# Patient Record
Sex: MALE | Race: WHITE | NOT HISPANIC OR LATINO | Employment: FULL TIME | URBAN - METROPOLITAN AREA
[De-identification: names, ages, dates, MRNs, and addresses within clinical notes are randomized per-mention and may not be internally consistent; named-entity substitution may affect disease eponyms.]

---
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
## 2025-06-10 ENCOUNTER — HOSPITAL ENCOUNTER (OUTPATIENT)
Facility: HOSPITAL | Age: 20
Discharge: HOME | End: 2025-06-12
Attending: STUDENT IN AN ORGANIZED HEALTH CARE EDUCATION/TRAINING PROGRAM | Admitting: INTERNAL MEDICINE
Payer: COMMERCIAL

## 2025-06-10 DIAGNOSIS — K35.200 ACUTE APPENDICITIS WITH GENERALIZED PERITONITIS WITHOUT GANGRENE, PERFORATION, OR ABSCESS: Primary | ICD-10-CM

## 2025-06-10 DIAGNOSIS — K37 APPENDICITIS, UNSPECIFIED APPENDICITIS TYPE: ICD-10-CM

## 2025-06-10 LAB
ALBUMIN SERPL-MCNC: 4.4 G/DL (ref 3.5–5)
ALBUMIN/GLOB SERPL: 1.1 RATIO (ref 1.1–2)
ALP SERPL-CCNC: 65 UNIT/L (ref 40–150)
ALT SERPL-CCNC: 36 UNIT/L (ref 0–55)
ANION GAP SERPL CALC-SCNC: 11 MEQ/L
AST SERPL-CCNC: 21 UNIT/L (ref 11–45)
BASOPHILS # BLD AUTO: 0.05 X10(3)/MCL
BASOPHILS NFR BLD AUTO: 0.5 %
BILIRUB SERPL-MCNC: 1.3 MG/DL
BILIRUB UR QL STRIP.AUTO: ABNORMAL
BUN SERPL-MCNC: 13 MG/DL (ref 8.9–20.6)
CALCIUM SERPL-MCNC: 9.5 MG/DL (ref 8.4–10.2)
CHLORIDE SERPL-SCNC: 101 MMOL/L (ref 98–107)
CLARITY UR: CLEAR
CO2 SERPL-SCNC: 27 MMOL/L (ref 22–29)
COLOR UR AUTO: YELLOW
CREAT SERPL-MCNC: 0.9 MG/DL (ref 0.72–1.25)
CREAT/UREA NIT SERPL: 14
EOSINOPHIL # BLD AUTO: 0.12 X10(3)/MCL (ref 0–0.9)
EOSINOPHIL NFR BLD AUTO: 1.2 %
ERYTHROCYTE [DISTWIDTH] IN BLOOD BY AUTOMATED COUNT: 12.3 % (ref 11.5–17)
GFR SERPLBLD CREATININE-BSD FMLA CKD-EPI: >60 ML/MIN/1.73/M2
GLOBULIN SER-MCNC: 4.1 GM/DL (ref 2.4–3.5)
GLUCOSE SERPL-MCNC: 86 MG/DL (ref 74–100)
GLUCOSE UR QL STRIP: NEGATIVE
HCT VFR BLD AUTO: 39.7 % (ref 42–52)
HGB BLD-MCNC: 13.4 G/DL (ref 14–18)
HGB UR QL STRIP: NEGATIVE
IMM GRANULOCYTES # BLD AUTO: 0.04 X10(3)/MCL (ref 0–0.04)
IMM GRANULOCYTES NFR BLD AUTO: 0.4 %
KETONES UR QL STRIP: ABNORMAL
LEUKOCYTE ESTERASE UR QL STRIP: NEGATIVE
LYMPHOCYTES # BLD AUTO: 2.19 X10(3)/MCL (ref 0.6–4.6)
LYMPHOCYTES NFR BLD AUTO: 22.7 %
MCH RBC QN AUTO: 28.8 PG (ref 27–31)
MCHC RBC AUTO-ENTMCNC: 33.8 G/DL (ref 33–36)
MCV RBC AUTO: 85.4 FL (ref 80–94)
MONOCYTES # BLD AUTO: 0.82 X10(3)/MCL (ref 0.1–1.3)
MONOCYTES NFR BLD AUTO: 8.5 %
NEUTROPHILS # BLD AUTO: 6.41 X10(3)/MCL (ref 2.1–9.2)
NEUTROPHILS NFR BLD AUTO: 66.7 %
NITRITE UR QL STRIP: NEGATIVE
NRBC BLD AUTO-RTO: 0 %
PH UR STRIP: 6 [PH]
PLATELET # BLD AUTO: 279 X10(3)/MCL (ref 130–400)
PMV BLD AUTO: 10.7 FL (ref 7.4–10.4)
POTASSIUM SERPL-SCNC: 3.6 MMOL/L (ref 3.5–5.1)
PROT SERPL-MCNC: 8.5 GM/DL (ref 6.4–8.3)
PROT UR QL STRIP: NEGATIVE
RBC # BLD AUTO: 4.65 X10(6)/MCL (ref 4.7–6.1)
SODIUM SERPL-SCNC: 139 MMOL/L (ref 136–145)
SP GR UR STRIP.AUTO: 1.01 (ref 1–1.03)
UROBILINOGEN UR STRIP-ACNC: 0.2
WBC # BLD AUTO: 9.63 X10(3)/MCL (ref 4.5–11.5)

## 2025-06-10 PROCEDURE — 25000003 PHARM REV CODE 250: Performed by: STUDENT IN AN ORGANIZED HEALTH CARE EDUCATION/TRAINING PROGRAM

## 2025-06-10 PROCEDURE — 81003 URINALYSIS AUTO W/O SCOPE: CPT | Performed by: STUDENT IN AN ORGANIZED HEALTH CARE EDUCATION/TRAINING PROGRAM

## 2025-06-10 PROCEDURE — 63600175 PHARM REV CODE 636 W HCPCS: Performed by: SURGERY

## 2025-06-10 PROCEDURE — 96365 THER/PROPH/DIAG IV INF INIT: CPT

## 2025-06-10 PROCEDURE — 85025 COMPLETE CBC W/AUTO DIFF WBC: CPT | Performed by: STUDENT IN AN ORGANIZED HEALTH CARE EDUCATION/TRAINING PROGRAM

## 2025-06-10 PROCEDURE — 80053 COMPREHEN METABOLIC PANEL: CPT | Performed by: STUDENT IN AN ORGANIZED HEALTH CARE EDUCATION/TRAINING PROGRAM

## 2025-06-10 PROCEDURE — 25500020 PHARM REV CODE 255: Performed by: STUDENT IN AN ORGANIZED HEALTH CARE EDUCATION/TRAINING PROGRAM

## 2025-06-10 PROCEDURE — G0378 HOSPITAL OBSERVATION PER HR: HCPCS

## 2025-06-10 PROCEDURE — 99285 EMERGENCY DEPT VISIT HI MDM: CPT | Mod: 25

## 2025-06-10 PROCEDURE — 63600175 PHARM REV CODE 636 W HCPCS: Performed by: STUDENT IN AN ORGANIZED HEALTH CARE EDUCATION/TRAINING PROGRAM

## 2025-06-10 RX ORDER — SODIUM CHLORIDE, SODIUM LACTATE, POTASSIUM CHLORIDE, CALCIUM CHLORIDE 600; 310; 30; 20 MG/100ML; MG/100ML; MG/100ML; MG/100ML
1000 INJECTION, SOLUTION INTRAVENOUS
Status: COMPLETED | OUTPATIENT
Start: 2025-06-10 | End: 2025-06-10

## 2025-06-10 RX ORDER — SODIUM CHLORIDE, SODIUM LACTATE, POTASSIUM CHLORIDE, CALCIUM CHLORIDE 600; 310; 30; 20 MG/100ML; MG/100ML; MG/100ML; MG/100ML
INJECTION, SOLUTION INTRAVENOUS CONTINUOUS
Status: DISCONTINUED | OUTPATIENT
Start: 2025-06-10 | End: 2025-06-12 | Stop reason: HOSPADM

## 2025-06-10 RX ORDER — SODIUM CHLORIDE 0.9 % (FLUSH) 0.9 %
10 SYRINGE (ML) INJECTION
Status: DISCONTINUED | OUTPATIENT
Start: 2025-06-10 | End: 2025-06-12 | Stop reason: HOSPADM

## 2025-06-10 RX ORDER — METRONIDAZOLE 500 MG/100ML
500 INJECTION, SOLUTION INTRAVENOUS
Status: DISCONTINUED | OUTPATIENT
Start: 2025-06-10 | End: 2025-06-12 | Stop reason: HOSPADM

## 2025-06-10 RX ORDER — ONDANSETRON HYDROCHLORIDE 2 MG/ML
4 INJECTION, SOLUTION INTRAVENOUS EVERY 6 HOURS PRN
Status: DISCONTINUED | OUTPATIENT
Start: 2025-06-10 | End: 2025-06-11

## 2025-06-10 RX ORDER — MORPHINE SULFATE 4 MG/ML
4 INJECTION, SOLUTION INTRAMUSCULAR; INTRAVENOUS EVERY 4 HOURS PRN
Status: DISCONTINUED | OUTPATIENT
Start: 2025-06-10 | End: 2025-06-12 | Stop reason: HOSPADM

## 2025-06-10 RX ORDER — TALC
6 POWDER (GRAM) TOPICAL NIGHTLY PRN
Status: DISCONTINUED | OUTPATIENT
Start: 2025-06-10 | End: 2025-06-12 | Stop reason: HOSPADM

## 2025-06-10 RX ORDER — IOPAMIDOL 755 MG/ML
100 INJECTION, SOLUTION INTRAVASCULAR
Status: COMPLETED | OUTPATIENT
Start: 2025-06-10 | End: 2025-06-10

## 2025-06-10 RX ADMIN — IOPAMIDOL 100 ML: 755 INJECTION, SOLUTION INTRAVENOUS at 07:06

## 2025-06-10 RX ADMIN — METRONIDAZOLE 500 MG: 5 INJECTION, SOLUTION INTRAVENOUS at 10:06

## 2025-06-10 RX ADMIN — SODIUM CHLORIDE, POTASSIUM CHLORIDE, SODIUM LACTATE AND CALCIUM CHLORIDE 1000 ML: 600; 310; 30; 20 INJECTION, SOLUTION INTRAVENOUS at 08:06

## 2025-06-10 RX ADMIN — PIPERACILLIN AND TAZOBACTAM 4.5 G: 4; .5 INJECTION, POWDER, LYOPHILIZED, FOR SOLUTION INTRAVENOUS; PARENTERAL at 08:06

## 2025-06-10 RX ADMIN — SODIUM CHLORIDE, POTASSIUM CHLORIDE, SODIUM LACTATE AND CALCIUM CHLORIDE: 600; 310; 30; 20 INJECTION, SOLUTION INTRAVENOUS at 09:06

## 2025-06-10 NOTE — ED PROVIDER NOTES
Encounter Date: 6/10/2025       History     Chief Complaint   Patient presents with    Abdominal Pain     Abd pain   started Fri   has moved to [facility]      HPI    20-year-old male with no stated past medical history presents emergency department for right lower quadrant abdominal pain that started Friday, 4 days ago.  Patient states the pain worsened and then started easing up a little bit but still having pain.  He is concerned he may have had appendicitis.  Denies any nausea vomiting constipation or diarrhea.  Took some ultrasound showed helps with the pain a few days ago.  No fever.    Review of patient's allergies indicates:  No Known Allergies  Past Medical History:   Diagnosis Date    Known health problems: none      Past Surgical History:   Procedure Laterality Date    none       No family history on file.  Social History[1]  Review of Systems   Constitutional:  Negative for fever.   Respiratory:  Negative for cough.    Cardiovascular:  Negative for chest pain.   Gastrointestinal:  Positive for abdominal pain. Negative for constipation, diarrhea, nausea and vomiting.   Neurological:  Negative for headaches.   All other systems reviewed and are negative.      Physical Exam     Initial Vitals [06/10/25 1833]   BP Pulse Resp Temp SpO2   125/80 76 16 98.2 °F (36.8 °C) 100 %      MAP       --         Physical Exam    Nursing note and vitals reviewed.  Constitutional: He appears well-developed and well-nourished. No distress.   Cardiovascular:  Normal rate and regular rhythm.           Pulmonary/Chest: Breath sounds normal. No respiratory distress.   Abdominal: Abdomen is soft. There is abdominal tenderness (Mild right lower quadrant abdominal pain.  No rebound or guarding).   Musculoskeletal:         General: No tenderness. Normal range of motion.     Neurological: He is alert and oriented to person, place, and time.   Skin: Skin is warm. Capillary refill takes less than 2 seconds.         ED Course    Procedures  Labs Reviewed   COMPREHENSIVE METABOLIC PANEL - Abnormal       Result Value    Sodium 139      Potassium 3.6      Chloride 101      CO2 27      Glucose 86      Blood Urea Nitrogen 13.0      Creatinine 0.90      Calcium 9.5      Protein Total 8.5 (*)     Albumin 4.4      Globulin 4.1 (*)     Albumin/Globulin Ratio 1.1      Bilirubin Total 1.3      ALP 65      ALT 36      AST 21      eGFR >60      Anion Gap 11.0      BUN/Creatinine Ratio 14     CBC WITH DIFFERENTIAL - Abnormal    WBC 9.63      RBC 4.65 (*)     Hgb 13.4 (*)     Hct 39.7 (*)     MCV 85.4      MCH 28.8      MCHC 33.8      RDW 12.3      Platelet 279      MPV 10.7 (*)     Neut % 66.7      Lymph % 22.7      Mono % 8.5      Eos % 1.2      Basophil % 0.5      Imm Grans % 0.4      Neut # 6.41      Lymph # 2.19      Mono # 0.82      Eos # 0.12      Baso # 0.05      Imm Gran # 0.04      NRBC% 0.0     CBC W/ AUTO DIFFERENTIAL    Narrative:     The following orders were created for panel order CBC auto differential.  Procedure                               Abnormality         Status                     ---------                               -----------         ------                     CBC with Differential[[id number]]        Abnormal            Final result                 Please view results for these tests on the individual orders.   URINALYSIS, REFLEX TO URINE CULTURE          Imaging Results              CT Abdomen Pelvis With IV Contrast NO Oral Contrast (Preliminary result)  Result time 06/10/25 20:05:03      Preliminary result by Yariel Hernandez Jr., MD (06/10/25 20:05:03)                   Narrative:    START OF REPORT:  Technique: CT of the abdomen and pelvis was performed with axial images as well as sagittal and coronal reconstruction images with intravenous contrast.    Comparison: None available.    Clinical History: Abdominal pain acute nonlocalized.    Dosage Information: Automated Exposure Control was  utilized.    Findings:  Lines and Tubes: None.  Thorax:  Lungs: No focal infiltrate or consolidation is seen.  Pleura: No effusions or thickening.  Heart: The heart size is within normal limits.  Abdomen:  Abdominal Wall: No abdominal wall pathology is seen.  Liver: The liver appears unremarkable.  Biliary System: No intrahepatic or extrahepatic biliary duct dilatation is seen.  Gallbladder: The gallbladder appears unremarkable.  Pancreas: The pancreas appears unremarkable.  Adrenals: The adrenal glands appear unremarkable.  Kidneys: The kidneys appear unremarkable with no stones cysts masses or hydronephrosis.  Aorta: The abdominal aorta appears unremarkable.  IVC: Unremarkable.  Bowel:  Esophagus: The visualized esophagus appears unremarkable.  Stomach: The stomach appears unremarkable.  Duodenum: Unremarkable appearing duodenum.  Small Bowel: The small bowel appears unremarkable.  Colon: Nondistended.  Appendix: The appendix is distended measuring 1.5 cm on Image 97, Series 2 and demonstrates wall thickening periappendiceal inflammatory changes and an appendicolith at the base. This is consistent with appendicitis.  Peritoneum: No free intraperitoneal air is seen. Trace free fluid is seen in the pelvis.    Pelvis:  Bladder: The bladder appears unremarkable.  Male:  Prostate gland: The prostate gland appears unremarkable.    Bony structures:  Dorsal Spine: The visualized dorsal spine appears unremarkable.  Bony Pelvis: The visualized bony structures of the pelvis appear unremarkable.    Notifications: The results were discussed with the emergency room physician Dr Huber prior to dictation at 2025-06-10 20:03:56 CDT.      Impression:  1. The appendix is distended measuring 1.5 cm on Image 97, Series 2 and demonstrates wall thickening periappendiceal inflammatory changes and an appendicolith at the base. This is consistent with appendicitis.  2. Details and findings as discussed.                                          Medications   piperacillin-tazobactam (ZOSYN) 4.5 g in D5W 100 mL IVPB (MB+) (has no administration in time range)   ondansetron injection 4 mg (has no administration in time range)   morphine injection 4 mg (has no administration in time range)   lactated ringers infusion (has no administration in time range)   sodium chloride 0.9% flush 10 mL (has no administration in time range)   melatonin tablet 6 mg (has no administration in time range)   iopamidoL (ISOVUE-370) injection 100 mL (100 mLs Intravenous Given 6/10/25 1934)     Medical Decision Making  Initial Assessment:       Abdominal pain      Differential Diagnosis:   Judging by the patient's chief complaint and pertinent history, the patient has the following possible differential diagnoses, including but not limited to the following.  Some of these are deemed to be lower likelihood and some more likely based on my physical exam and history combined with possible lab work and/or imaging studies.   Please see the pertinent studies, and refer to the HPI.  Some of these diagnoses will take further evaluation to fully rule out, perhaps as an outpatient and the patient was encouraged to follow up when discharged for more comprehensive evaluation.      Abdominal pain, constipation, appendicitis, UTI, urolithiasis, pyelonephritis, diverticulitis, musculoskeletal pain,  as well as multiple other possible etiologies      Problems Addressed:  Acute appendicitis with generalized peritonitis without gangrene, perforation, or abscess: acute illness or injury    Amount and/or Complexity of Data Reviewed  Labs: ordered. Decision-making details documented in ED Course.  Radiology: ordered.    Risk  OTC drugs.  Prescription drug management.  Decision regarding hospitalization.               ED Course as of 06/10/25 2019   Tue Juan Pablo 10, 2025   1908 WBC: 9.63 [BS]   1908 Hemoglobin(!): 13.4 [BS]   1908 Hematocrit(!): 39.7 [BS]   1908 Platelet Count: 279 [BS]   2014 Spoke  with general surgeon on-call, Dr. Slater, admit.  Zosyn.  Pain medicine p.r.n..  NPO.  Admit to medicine [BS]   2015 Spoke with Dr. Whyte, hospitalist, agrees with the admission [BS]      ED Course User Index  [BS] Andi Huber MD                           Clinical Impression:  Final diagnoses:  [K35.200] Acute appendicitis with generalized peritonitis without gangrene, perforation, or abscess (Primary)          ED Disposition Condition    Observation                       [1]   Social History  Tobacco Use    Smoking status: Never    Smokeless tobacco: Never   Substance Use Topics    Alcohol use: Not Currently    Drug use: Not Currently        Andi Huber MD  06/10/25 2019

## 2025-06-11 ENCOUNTER — ANESTHESIA (OUTPATIENT)
Dept: SURGERY | Facility: HOSPITAL | Age: 20
End: 2025-06-11
Payer: COMMERCIAL

## 2025-06-11 ENCOUNTER — ANESTHESIA EVENT (OUTPATIENT)
Dept: SURGERY | Facility: HOSPITAL | Age: 20
End: 2025-06-11
Payer: COMMERCIAL

## 2025-06-11 PROBLEM — K35.200 ACUTE APPENDICITIS WITH GENERALIZED PERITONITIS WITHOUT GANGRENE, PERFORATION, OR ABSCESS: Status: ACTIVE | Noted: 2025-06-11

## 2025-06-11 LAB
ALBUMIN SERPL-MCNC: 3.8 G/DL (ref 3.5–5)
ALBUMIN/GLOB SERPL: 1.1 RATIO (ref 1.1–2)
ALP SERPL-CCNC: 56 UNIT/L (ref 40–150)
ALT SERPL-CCNC: 31 UNIT/L (ref 0–55)
ANION GAP SERPL CALC-SCNC: 8 MEQ/L
AST SERPL-CCNC: 17 UNIT/L (ref 11–45)
BASOPHILS # BLD AUTO: 0.03 X10(3)/MCL
BASOPHILS NFR BLD AUTO: 0.4 %
BILIRUB SERPL-MCNC: 1.4 MG/DL
BUN SERPL-MCNC: 12 MG/DL (ref 8.9–20.6)
CALCIUM SERPL-MCNC: 9.2 MG/DL (ref 8.4–10.2)
CHLORIDE SERPL-SCNC: 104 MMOL/L (ref 98–107)
CO2 SERPL-SCNC: 28 MMOL/L (ref 22–29)
CREAT SERPL-MCNC: 0.95 MG/DL (ref 0.72–1.25)
CREAT/UREA NIT SERPL: 13
EOSINOPHIL # BLD AUTO: 0.14 X10(3)/MCL (ref 0–0.9)
EOSINOPHIL NFR BLD AUTO: 1.8 %
ERYTHROCYTE [DISTWIDTH] IN BLOOD BY AUTOMATED COUNT: 12.1 % (ref 11.5–17)
GFR SERPLBLD CREATININE-BSD FMLA CKD-EPI: >60 ML/MIN/1.73/M2
GLOBULIN SER-MCNC: 3.4 GM/DL (ref 2.4–3.5)
GLUCOSE SERPL-MCNC: 83 MG/DL (ref 74–100)
HCT VFR BLD AUTO: 34.7 % (ref 42–52)
HGB BLD-MCNC: 11.4 G/DL (ref 14–18)
IMM GRANULOCYTES # BLD AUTO: 0.03 X10(3)/MCL (ref 0–0.04)
IMM GRANULOCYTES NFR BLD AUTO: 0.4 %
LYMPHOCYTES # BLD AUTO: 1.93 X10(3)/MCL (ref 0.6–4.6)
LYMPHOCYTES NFR BLD AUTO: 25.3 %
MCH RBC QN AUTO: 28.4 PG (ref 27–31)
MCHC RBC AUTO-ENTMCNC: 32.9 G/DL (ref 33–36)
MCV RBC AUTO: 86.3 FL (ref 80–94)
MONOCYTES # BLD AUTO: 0.75 X10(3)/MCL (ref 0.1–1.3)
MONOCYTES NFR BLD AUTO: 9.8 %
NEUTROPHILS # BLD AUTO: 4.74 X10(3)/MCL (ref 2.1–9.2)
NEUTROPHILS NFR BLD AUTO: 62.3 %
PLATELET # BLD AUTO: 230 X10(3)/MCL (ref 130–400)
PMV BLD AUTO: 10.8 FL (ref 7.4–10.4)
POTASSIUM SERPL-SCNC: 3.8 MMOL/L (ref 3.5–5.1)
PROT SERPL-MCNC: 7.2 GM/DL (ref 6.4–8.3)
RBC # BLD AUTO: 4.02 X10(6)/MCL (ref 4.7–6.1)
SODIUM SERPL-SCNC: 140 MMOL/L (ref 136–145)
WBC # BLD AUTO: 7.62 X10(3)/MCL (ref 4.5–11.5)

## 2025-06-11 PROCEDURE — 27201423 OPTIME MED/SURG SUP & DEVICES STERILE SUPPLY: Performed by: SURGERY

## 2025-06-11 PROCEDURE — 63600175 PHARM REV CODE 636 W HCPCS: Performed by: ANESTHESIOLOGY

## 2025-06-11 PROCEDURE — 96376 TX/PRO/DX INJ SAME DRUG ADON: CPT

## 2025-06-11 PROCEDURE — 25000003 PHARM REV CODE 250: Performed by: SURGERY

## 2025-06-11 PROCEDURE — 63600175 PHARM REV CODE 636 W HCPCS: Performed by: SURGERY

## 2025-06-11 PROCEDURE — 63600175 PHARM REV CODE 636 W HCPCS: Performed by: NURSE ANESTHETIST, CERTIFIED REGISTERED

## 2025-06-11 PROCEDURE — 36415 COLL VENOUS BLD VENIPUNCTURE: CPT | Performed by: STUDENT IN AN ORGANIZED HEALTH CARE EDUCATION/TRAINING PROGRAM

## 2025-06-11 PROCEDURE — 63600175 PHARM REV CODE 636 W HCPCS: Performed by: INTERNAL MEDICINE

## 2025-06-11 PROCEDURE — 25000003 PHARM REV CODE 250: Performed by: NURSE ANESTHETIST, CERTIFIED REGISTERED

## 2025-06-11 PROCEDURE — 96375 TX/PRO/DX INJ NEW DRUG ADDON: CPT

## 2025-06-11 PROCEDURE — 25000003 PHARM REV CODE 250: Performed by: INTERNAL MEDICINE

## 2025-06-11 PROCEDURE — 80053 COMPREHEN METABOLIC PANEL: CPT | Performed by: STUDENT IN AN ORGANIZED HEALTH CARE EDUCATION/TRAINING PROGRAM

## 2025-06-11 PROCEDURE — 36000708 HC OR TIME LEV III 1ST 15 MIN: Performed by: SURGERY

## 2025-06-11 PROCEDURE — 85025 COMPLETE CBC W/AUTO DIFF WBC: CPT | Performed by: STUDENT IN AN ORGANIZED HEALTH CARE EDUCATION/TRAINING PROGRAM

## 2025-06-11 PROCEDURE — 36000709 HC OR TIME LEV III EA ADD 15 MIN: Performed by: SURGERY

## 2025-06-11 PROCEDURE — 71000033 HC RECOVERY, INTIAL HOUR: Performed by: SURGERY

## 2025-06-11 PROCEDURE — 37000009 HC ANESTHESIA EA ADD 15 MINS: Performed by: SURGERY

## 2025-06-11 PROCEDURE — 37000008 HC ANESTHESIA 1ST 15 MINUTES: Performed by: SURGERY

## 2025-06-11 PROCEDURE — G0378 HOSPITAL OBSERVATION PER HR: HCPCS

## 2025-06-11 PROCEDURE — 94761 N-INVAS EAR/PLS OXIMETRY MLT: CPT

## 2025-06-11 PROCEDURE — 99900035 HC TECH TIME PER 15 MIN (STAT)

## 2025-06-11 RX ORDER — CEFOXITIN 2 G/1
INJECTION, POWDER, FOR SOLUTION INTRAVENOUS
Status: DISCONTINUED | OUTPATIENT
Start: 2025-06-11 | End: 2025-06-11

## 2025-06-11 RX ORDER — ONDANSETRON HYDROCHLORIDE 2 MG/ML
4 INJECTION, SOLUTION INTRAVENOUS EVERY 4 HOURS PRN
Status: DISCONTINUED | OUTPATIENT
Start: 2025-06-11 | End: 2025-06-12 | Stop reason: HOSPADM

## 2025-06-11 RX ORDER — DEXAMETHASONE SODIUM PHOSPHATE 4 MG/ML
INJECTION, SOLUTION INTRA-ARTICULAR; INTRALESIONAL; INTRAMUSCULAR; INTRAVENOUS; SOFT TISSUE
Status: DISCONTINUED | OUTPATIENT
Start: 2025-06-11 | End: 2025-06-11

## 2025-06-11 RX ORDER — ONDANSETRON HYDROCHLORIDE 2 MG/ML
INJECTION, SOLUTION INTRAVENOUS
Status: DISCONTINUED | OUTPATIENT
Start: 2025-06-11 | End: 2025-06-11

## 2025-06-11 RX ORDER — HYDROMORPHONE HYDROCHLORIDE 2 MG/ML
0.2 INJECTION, SOLUTION INTRAMUSCULAR; INTRAVENOUS; SUBCUTANEOUS EVERY 5 MIN PRN
Status: DISCONTINUED | OUTPATIENT
Start: 2025-06-11 | End: 2025-06-11

## 2025-06-11 RX ORDER — FENTANYL CITRATE 50 UG/ML
25 INJECTION, SOLUTION INTRAMUSCULAR; INTRAVENOUS EVERY 5 MIN PRN
Status: DISCONTINUED | OUTPATIENT
Start: 2025-06-11 | End: 2025-06-11

## 2025-06-11 RX ORDER — MIDAZOLAM HYDROCHLORIDE 1 MG/ML
INJECTION INTRAMUSCULAR; INTRAVENOUS
Status: DISCONTINUED | OUTPATIENT
Start: 2025-06-11 | End: 2025-06-11

## 2025-06-11 RX ORDER — PROPOFOL 10 MG/ML
VIAL (ML) INTRAVENOUS
Status: DISCONTINUED | OUTPATIENT
Start: 2025-06-11 | End: 2025-06-11

## 2025-06-11 RX ORDER — FAMOTIDINE 10 MG/ML
INJECTION, SOLUTION INTRAVENOUS
Status: DISCONTINUED | OUTPATIENT
Start: 2025-06-11 | End: 2025-06-11

## 2025-06-11 RX ORDER — GLUCAGON 1 MG
1 KIT INJECTION
Status: DISCONTINUED | OUTPATIENT
Start: 2025-06-11 | End: 2025-06-11

## 2025-06-11 RX ORDER — ACETAMINOPHEN 10 MG/ML
INJECTION, SOLUTION INTRAVENOUS
Status: DISCONTINUED | OUTPATIENT
Start: 2025-06-11 | End: 2025-06-11

## 2025-06-11 RX ORDER — FENTANYL CITRATE 50 UG/ML
INJECTION, SOLUTION INTRAMUSCULAR; INTRAVENOUS
Status: DISCONTINUED | OUTPATIENT
Start: 2025-06-11 | End: 2025-06-11

## 2025-06-11 RX ORDER — HYDROCODONE BITARTRATE AND ACETAMINOPHEN 7.5; 325 MG/1; MG/1
1 TABLET ORAL EVERY 6 HOURS PRN
Refills: 0 | Status: DISCONTINUED | OUTPATIENT
Start: 2025-06-11 | End: 2025-06-12 | Stop reason: HOSPADM

## 2025-06-11 RX ORDER — HYDROMORPHONE HYDROCHLORIDE 2 MG/ML
INJECTION, SOLUTION INTRAMUSCULAR; INTRAVENOUS; SUBCUTANEOUS
Status: DISCONTINUED | OUTPATIENT
Start: 2025-06-11 | End: 2025-06-11

## 2025-06-11 RX ORDER — LIDOCAINE HYDROCHLORIDE AND EPINEPHRINE 10; 10 UG/ML; MG/ML
INJECTION, SOLUTION INFILTRATION; PERINEURAL
Status: DISCONTINUED | OUTPATIENT
Start: 2025-06-11 | End: 2025-06-11 | Stop reason: HOSPADM

## 2025-06-11 RX ORDER — GLYCOPYRROLATE 0.2 MG/ML
INJECTION INTRAMUSCULAR; INTRAVENOUS
Status: DISCONTINUED | OUTPATIENT
Start: 2025-06-11 | End: 2025-06-11

## 2025-06-11 RX ORDER — KETOROLAC TROMETHAMINE 30 MG/ML
INJECTION, SOLUTION INTRAMUSCULAR; INTRAVENOUS
Status: DISCONTINUED | OUTPATIENT
Start: 2025-06-11 | End: 2025-06-11

## 2025-06-11 RX ORDER — ROCURONIUM BROMIDE 10 MG/ML
INJECTION, SOLUTION INTRAVENOUS
Status: DISCONTINUED | OUTPATIENT
Start: 2025-06-11 | End: 2025-06-11

## 2025-06-11 RX ORDER — LIDOCAINE HYDROCHLORIDE 20 MG/ML
INJECTION, SOLUTION EPIDURAL; INFILTRATION; INTRACAUDAL; PERINEURAL
Status: DISCONTINUED | OUTPATIENT
Start: 2025-06-11 | End: 2025-06-11

## 2025-06-11 RX ADMIN — MORPHINE SULFATE 4 MG: 4 INJECTION INTRAVENOUS at 03:06

## 2025-06-11 RX ADMIN — HYDROMORPHONE HYDROCHLORIDE 0.2 MG: 2 INJECTION, SOLUTION INTRAMUSCULAR; INTRAVENOUS; SUBCUTANEOUS at 07:06

## 2025-06-11 RX ADMIN — FENTANYL CITRATE 100 MCG: 50 INJECTION, SOLUTION INTRAMUSCULAR; INTRAVENOUS at 05:06

## 2025-06-11 RX ADMIN — HYDROMORPHONE HYDROCHLORIDE 0.5 MG: 2 INJECTION INTRAMUSCULAR; INTRAVENOUS; SUBCUTANEOUS at 06:06

## 2025-06-11 RX ADMIN — ONDANSETRON 4 MG: 2 INJECTION INTRAMUSCULAR; INTRAVENOUS at 05:06

## 2025-06-11 RX ADMIN — ONDANSETRON 4 MG: 2 INJECTION INTRAMUSCULAR; INTRAVENOUS at 08:06

## 2025-06-11 RX ADMIN — PIPERACILLIN AND TAZOBACTAM 4.5 G: 4; .5 INJECTION, POWDER, LYOPHILIZED, FOR SOLUTION INTRAVENOUS at 12:06

## 2025-06-11 RX ADMIN — MORPHINE SULFATE 4 MG: 4 INJECTION INTRAVENOUS at 09:06

## 2025-06-11 RX ADMIN — SODIUM CHLORIDE, POTASSIUM CHLORIDE, SODIUM LACTATE AND CALCIUM CHLORIDE: 600; 310; 30; 20 INJECTION, SOLUTION INTRAVENOUS at 06:06

## 2025-06-11 RX ADMIN — PIPERACILLIN AND TAZOBACTAM 4.5 G: 4; .5 INJECTION, POWDER, LYOPHILIZED, FOR SOLUTION INTRAVENOUS at 05:06

## 2025-06-11 RX ADMIN — Medication 6 MG: at 08:06

## 2025-06-11 RX ADMIN — PROPOFOL 200 MG: 10 INJECTION, EMULSION INTRAVENOUS at 05:06

## 2025-06-11 RX ADMIN — SODIUM CHLORIDE, POTASSIUM CHLORIDE, SODIUM LACTATE AND CALCIUM CHLORIDE: 600; 310; 30; 20 INJECTION, SOLUTION INTRAVENOUS at 05:06

## 2025-06-11 RX ADMIN — DEXAMETHASONE SODIUM PHOSPHATE 4 MG: 4 INJECTION, SOLUTION INTRA-ARTICULAR; INTRALESIONAL; INTRAMUSCULAR; INTRAVENOUS; SOFT TISSUE at 05:06

## 2025-06-11 RX ADMIN — ROCURONIUM BROMIDE 50 MG: 10 INJECTION, SOLUTION INTRAVENOUS at 05:06

## 2025-06-11 RX ADMIN — PIPERACILLIN AND TAZOBACTAM 4.5 G: 4; .5 INJECTION, POWDER, LYOPHILIZED, FOR SOLUTION INTRAVENOUS at 08:06

## 2025-06-11 RX ADMIN — METRONIDAZOLE 500 MG: 5 INJECTION, SOLUTION INTRAVENOUS at 05:06

## 2025-06-11 RX ADMIN — FAMOTIDINE 20 MG: 10 INJECTION INTRAVENOUS at 05:06

## 2025-06-11 RX ADMIN — METRONIDAZOLE 500 MG: 5 INJECTION, SOLUTION INTRAVENOUS at 09:06

## 2025-06-11 RX ADMIN — LIDOCAINE HYDROCHLORIDE 100 MG: 20 INJECTION, SOLUTION EPIDURAL; INFILTRATION; INTRACAUDAL; PERINEURAL at 05:06

## 2025-06-11 RX ADMIN — SUGAMMADEX 200 MG: 100 INJECTION, SOLUTION INTRAVENOUS at 06:06

## 2025-06-11 RX ADMIN — KETOROLAC TROMETHAMINE 15 MG: 30 INJECTION, SOLUTION INTRAMUSCULAR at 05:06

## 2025-06-11 RX ADMIN — HYDROCODONE BITARTRATE AND ACETAMINOPHEN 1 TABLET: 7.5; 325 TABLET ORAL at 08:06

## 2025-06-11 RX ADMIN — GLYCOPYRROLATE 0.2 MG: 0.2 INJECTION INTRAMUSCULAR; INTRAVENOUS at 06:06

## 2025-06-11 RX ADMIN — ACETAMINOPHEN 1000 MG: 10 INJECTION, SOLUTION INTRAVENOUS at 06:06

## 2025-06-11 RX ADMIN — CEFOXITIN SODIUM 2 G: 2 POWDER, FOR SOLUTION INTRAVENOUS at 06:06

## 2025-06-11 RX ADMIN — MIDAZOLAM 2 MG: 1 INJECTION INTRAMUSCULAR; INTRAVENOUS at 05:06

## 2025-06-11 RX ADMIN — ONDANSETRON 4 MG: 2 INJECTION INTRAMUSCULAR; INTRAVENOUS at 01:06

## 2025-06-11 RX ADMIN — ONDANSETRON 4 MG: 2 INJECTION INTRAMUSCULAR; INTRAVENOUS at 09:06

## 2025-06-11 NOTE — ANESTHESIA POSTPROCEDURE EVALUATION
Anesthesia Post Evaluation    Patient: Craig Rivas    Procedure(s) Performed: Procedure(s) (LRB):  APPENDECTOMY, LAPAROSCOPIC (N/A)    Final Anesthesia Type: general      Patient location during evaluation: PACU  Patient participation: Yes- Able to Participate  Level of consciousness: awake and alert  Post-procedure vital signs: reviewed and stable  Pain management: adequate  Airway patency: patent  IVAN mitigation strategies: Multimodal analgesia, Extubation while patient is awake and Verification of full reversal of neuromuscular block  PONV status at discharge: No PONV  Anesthetic complications: no      Cardiovascular status: blood pressure returned to baseline  Respiratory status: unassisted  Hydration status: euvolemic  Follow-up not needed.              Vitals Value Taken Time   /105 06/11/25 07:36   Temp 36.5 °C (97.7 °F) 06/11/25 07:00   Pulse 85 06/11/25 07:36   Resp 13 06/11/25 07:36   SpO2 100 % 06/11/25 07:36   Vitals shown include unfiled device data.      Event Time   Out of Recovery 06/11/2025 07:38:32         Pain/Matt Score: Pain Rating Prior to Med Admin: 6 (6/11/2025  7:27 AM)  Matt Score: 10 (6/11/2025  7:37 AM)

## 2025-06-11 NOTE — PLAN OF CARE
06/11/25 1457   Discharge Assessment   Assessment Type Discharge Planning Assessment   Confirmed/corrected address, phone number and insurance Yes   Confirmed Demographics Correct on Facesheet   Source of Information patient   People in Home parent(s)   Do you expect to return to your current living situation? Yes   Do you have help at home or someone to help you manage your care at home? Yes   Prior to hospitilization cognitive status: Alert/Oriented;No Deficits   Current cognitive status: Alert/Oriented;No Deficits   Walking or Climbing Stairs Difficulty no   Dressing/Bathing Difficulty no   Equipment Currently Used at Home none   Do you currently have service(s) that help you manage your care at home? No   Do you take prescription medications? Yes   Do you have prescription coverage? Yes   Do you have any problems affording any of your prescribed medications? No   Is the patient taking medications as prescribed? yes   Are you on dialysis? No   Do you take coumadin? No   Discharge Plan A Home with family   Discharge Plan B Home with family   DME Needed Upon Discharge  none   Discharge Plan discussed with: Patient   Transition of Care Barriers None   Physical Activity   On average, how many days per week do you engage in moderate to strenuous exercise (like a brisk walk)? 0 days   On average, how many minutes do you engage in exercise at this level? 0 min   Financial Resource Strain   How hard is it for you to pay for the very basics like food, housing, medical care, and heating? Not very   Housing Stability   In the last 12 months, was there a time when you were not able to pay the mortgage or rent on time? N   At any time in the past 12 months, were you homeless or living in a shelter (including now)? N   Transportation Needs   In the past 12 months, has lack of transportation kept you from medical appointments or from getting medications? no   In the past 12 months, has lack of transportation kept you from  meetings, work, or from getting things needed for daily living? No   Food Insecurity   Within the past 12 months, you worried that your food would run out before you got the money to buy more. Never true   Within the past 12 months, the food you bought just didn't last and you didn't have money to get more. Never true   Stress   Do you feel stress - tense, restless, nervous, or anxious, or unable to sleep at night because your mind is troubled all the time - these days? Not at all   Social Isolation   How often do you feel lonely or isolated from those around you?  Never   Alcohol Use   Q1: How often do you have a drink containing alcohol? Never   Q2: How many drinks containing alcohol do you have on a typical day when you are drinking? None   Q3: How often do you have six or more drinks on one occasion? Never   Utilities   In the past 12 months has the electric, gas, oil, or water company threatened to shut off services in your home? No   Health Literacy   How often do you need to have someone help you when you read instructions, pamphlets, or other written material from your doctor or pharmacy? Rarely

## 2025-06-11 NOTE — OP NOTE
Procedure date: 06/11/2025     Indications: 20-year-old white male presenting a proximally 3 days following onset of right lower quadrant pain discomfort with the associated imaging consistent with acute appendicitis and clinical evidence with the same.  Patient undergoing emergent laparoscopic appendectomy     Preoperative diagnosis: Acute appendicitis   Postoperative diagnosis:  Acute appendicitis     Procedure performed: Laparoscopic appendectomy     Procedure in detail:  Patient was brought to the operative theater laid in a supine position.  Intravenous general anesthesia provided.  Preoperative antibiotics administered.  The area of the abdomen was then sterilely prepped and draped in normal surgical fashion using chlorhexidine.  1% lidocaine with epinephrine infiltrate the subcutaneous tissues overlying the supraumbilical space.  A 15 blade was used to incise the skin with dissection down the fatty tissues.  Veress needle technique used into the abdomen on the 1st pass and insufflated to 15 mm of mercury pressure using CO2 gas.  A 5 mm Visiport trocar was then introduced to the same region.  Secondary dissection trocars were then placed 1 in the suprapubic 1 in the right hypogastrium.  The appendix was identified in the right lower quadrant suppurative purulent but not perforated.  It was grasped and elevated.  The mesoappendix was dissected using harmonic scalpel.  The base of the appendix was then transected using a 45 mm blue load laparoscopic MARKO stapler.  Upon transection of the base was placed within the Endo-Catch bag and retrieved from the right trocar site.  There was appropriate hemostasis and closure of the appendiceal stump.  Pictures were taken.  The fascia of the right trocar site was then closed in interrupted manner using 0 Vicryl.  The skin and subcutaneous tissues reapproximated in interrupted manner using 4-0 Monocryl subcuticular stitch.  A sterile dressing was then placed over all the  wound sites.  The patient was then relieved of anesthesia stable condition and transferred to postanesthesia care unit.      Complications: None   Estimated blood loss:  3 cc   Specimens: Appendix and mesoappendix    Disposition: Upon recovering from anesthesia patient will be transferred back to the floor for therapeutic IV antibiotics and advancement of diet with pain control measures.      Jacki Slater MD

## 2025-06-11 NOTE — ANESTHESIA PREPROCEDURE EVALUATION
06/11/2025  Craig Rivas is a 20 y.o., male.      Pre-op Assessment    I have reviewed the Patient Summary Reports.     I have reviewed the Nursing Notes. I have reviewed the NPO Status.   I have reviewed the Medications.     Review of Systems  Anesthesia Hx:             Denies Family Hx of Anesthesia complications.    Denies Personal Hx of Anesthesia complications.                    Social:  No Alcohol Use, Non-Smoker       Hematology/Oncology:    Oncology Normal    -- Anemia:                                  EENT/Dental:  EENT/Dental Normal           Cardiovascular:  Cardiovascular Normal                                              Pulmonary:  Pulmonary Normal                       Renal/:  Renal/ Normal                 Hepatic/GI:  Hepatic/GI Normal                    Musculoskeletal:  Musculoskeletal Normal                Neurological:  Neurology Normal                                      Endocrine:  Endocrine Normal            Dermatological:  Skin Normal    Psych:  Psychiatric Normal                    Physical Exam  General: Cooperative, Alert and Oriented    Airway:  Mallampati: II   Mouth Opening: Normal  TM Distance: Normal  Tongue: Normal  Neck ROM: Normal ROM    Dental:  Intact        Anesthesia Plan  Type of Anesthesia, risks & benefits discussed:    Anesthesia Type: Gen ETT  Intra-op Monitoring Plan: Standard ASA Monitors  Post Op Pain Control Plan: multimodal analgesia  Induction:  IV  Airway Plan: Direct  Informed Consent: Informed consent signed with the Patient and all parties understand the risks and agree with anesthesia plan.  All questions answered. Patient consented to blood products? Yes  ASA Score: 1 Emergent    Ready For Surgery From Anesthesia Perspective.     .

## 2025-06-11 NOTE — CONSULTS
[facility]  Medical Surgical Unit  General Surgery  Consult Note    Consults  Subjective:     Chief Complaint/Reason for Admission:  Right lower quadrant pain    History of Present Illness:  20-year-old white male with complaint epigastric pain which isolated to itself in the right lower quadrant over the last 3 days.  Patient stayed on Friday evening began to have some discomfort which began to radiate down to the right lower quadrant.  It isolated itself in that position for the last 24 hours.  It has been decreasing in quantity and quality.  He denies associated fevers sweats and chills.  He underwent a CT scan of the abdomen which identified inflammatory changes consistent with acute appendicitis.  He is being admitted for laparoscopic appendectomy and empiric IV antibiotic therapy.    No current facility-administered medications on file prior to encounter.     Current Outpatient Medications on File Prior to Encounter   Medication Sig    mupirocin (BACTROBAN) 2 % ointment Apply topically 3 (three) times daily.       Review of patient's allergies indicates:  No Known Allergies    Past Medical History:   Diagnosis Date    Known health problems: none      Past Surgical History:   Procedure Laterality Date    none       Family History       Problem Relation (Age of Onset)    No Known Problems Mother, Father          Tobacco Use    Smoking status: Never    Smokeless tobacco: Never   Vaping Use    Vaping status: Never Used   Substance and Sexual Activity    Alcohol use: Not Currently    Drug use: Yes     Types: Marijuana    Sexual activity: Not on file     Review of Systems   All other systems reviewed and are negative.    Objective:     Vital Signs (Most Recent):  Temp: 98.7 °F (37.1 °C) (06/10/25 2131)  Pulse: 82 (06/10/25 2131)  Resp: 18 (06/10/25 2131)  BP: 126/71 (06/10/25 2131)  SpO2: 100 % (06/10/25 2131) Vital Signs (24h Range):  Temp:  [98.2 °F (36.8 °C)-98.7 °F (37.1 °C)] 98.7 °F (37.1  °C)  Pulse:  [72-82] 82  Resp:  [16-18] 18  SpO2:  [98 %-100 %] 100 %  BP: (121-131)/(66-80) 126/71     Weight: 86.2 kg (190 lb)  Body mass index is 26.5 kg/m².      Intake/Output Summary (Last 24 hours) at 6/10/2025 2157  Last data filed at 6/10/2025 2114  Gross per 24 hour   Intake 100.17 ml   Output --   Net 100.17 ml       Physical Exam  Vitals reviewed.   Constitutional:       Appearance: Normal appearance.   HENT:      Head: Normocephalic and atraumatic.      Nose: Nose normal.   Eyes:      Extraocular Movements: Extraocular movements intact.      Pupils: Pupils are equal, round, and reactive to light.   Cardiovascular:      Rate and Rhythm: Normal rate and regular rhythm.   Pulmonary:      Effort: Pulmonary effort is normal.      Breath sounds: Normal breath sounds.   Abdominal:      General: Abdomen is flat. Bowel sounds are normal. There is no distension.      Palpations: Abdomen is soft. There is no mass.      Tenderness: There is abdominal tenderness. There is guarding and rebound. There is no right CVA tenderness or left CVA tenderness.      Hernia: No hernia is present.      Comments: Isolated peritoneal irritation located in the right lower quadrant with rebound consistent with acute appendicitis and McBurney sign   Genitourinary:     Penis: Normal.    Musculoskeletal:         General: Normal range of motion.      Cervical back: Neck supple.   Skin:     General: Skin is warm and dry.   Neurological:      General: No focal deficit present.      Mental Status: He is alert.         Significant Labs:  CBC:   Recent Labs   Lab 06/10/25  1844   WBC 9.63   RBC 4.65*   HGB 13.4*   HCT 39.7*      MCV 85.4   MCH 28.8   MCHC 33.8     CMP:   Recent Labs   Lab 06/10/25  1844   GLU 86   CALCIUM 9.5   ALBUMIN 4.4   PROT 8.5*      K 3.6   CO2 27      BUN 13.0   CREATININE 0.90   ALKPHOS 65   ALT 36   AST 21   BILITOT 1.3       Significant Diagnostics:  CT: I have reviewed all pertinent  results/findings within the past 24 hours.  Acute appendicitis    Assessment/Plan:     Patient to remain NPO and empiric IV antibiotic therapy with plan for appendectomy in the a.m.  Consent for laparoscopic appendectomy has been obtained after risks benefits and alternatives to procedure explained and all questions answered      Thank you for your consult. I will follow-up with patient. Please contact us if you have any additional questions.    Jacki Slater MD  General Surgery  [facility] - Medical Surgical Unit

## 2025-06-11 NOTE — PROGRESS NOTES
Hospital Medicine Progress Note        Chief Complaint: Inpatient Follow-up for     HPI:   19 y/o with no significant pmhx who presented to the ED for evaluation of abdominal pain that started about 3 days ago and mainly located in the RLQ. No nausea, vomiting, fever or chills.   CT Abd/pelvis consistent with Acute Appendicitis and General surgery consulted     June 11, 2025-general surgeon performed laparoscopic appendectomy this morning, complain of abdominal pain after surgery, no fever, no shortness for breath, no nausea    Case was discussed with patient's nurse and  on the floor.    Objective/physical exam:  General: In no acute distress, afebrile  Chest: Clear to auscultation bilaterally  Heart: RRR, +S1, S2, no appreciable murmur  Abdomen: Soft, tender, BS +, laparoscopic surgical site covered with a dressing  MSK: Warm, no lower extremity edema, no clubbing or cyanosis  Neurologic: Alert and oriented x4, Cranial nerve II-XII intact, Strength 5/5 in all 4 extremities    VITAL SIGNS: 24 HRS MIN & MAX LAST   Temp  Min: 97.7 °F (36.5 °C)  Max: 98.7 °F (37.1 °C) 97.7 °F (36.5 °C)   BP  Min: 107/62  Max: 155/84 132/63   Pulse  Min: 52  Max: 105  84   Resp  Min: 14  Max: 20 16   SpO2  Min: 97 %  Max: 100 % 100 %     I have reviewed the following labs:  Recent Labs   Lab 06/10/25  1844 06/11/25  0300   WBC 9.63 7.62   RBC 4.65* 4.02*   HGB 13.4* 11.4*   HCT 39.7* 34.7*   MCV 85.4 86.3   MCH 28.8 28.4   MCHC 33.8 32.9*   RDW 12.3 12.1    230   MPV 10.7* 10.8*     Recent Labs   Lab 06/10/25  1844 06/11/25  0301    140   K 3.6 3.8    104   CO2 27 28   BUN 13.0 12.0   CREATININE 0.90 0.95   GLU 86 83   CALCIUM 9.5 9.2   ALBUMIN 4.4 3.8   PROT 8.5* 7.2   ALKPHOS 65 56   ALT 36 31   AST 21 17   BILITOT 1.3 1.4     Microbiology Results (last 7 days)       ** No results found for the last 168 hours. **             See below for Radiology    Assessment/Plan:  Acute appendicitis   Anemia    Status post laparoscopic appendectomy June 11, 2025     Continue IV Zosyn and IV Flagyl   Continue hydration with lactated Ringer saline 125 cc/hour   Consult general surgeon   Check CBC BMP in a.m.      VTE prophylaxis:     Patient condition:  Stable/Fair/Guarded/ Serious/ Critical    Anticipated discharge and Disposition:         All diagnosis and differential diagnosis have been reviewed; assessment and plan has been documented; I have personally reviewed the labs and test results that are presently available; I have reviewed the patients medication list; I have reviewed the consulting providers response and recommendations. I have reviewed or attempted to review medical records based upon their availability    All of the patient's questions have been  addressed and answered. Patient's is agreeable to the above stated plan. I will continue to monitor closely and make adjustments to medical management as needed.    Portions of this note dictated using EMR integrated voice recognition software, and may be subject to voice recognition errors not corrected at proofreading. Please contact writer for clarification if needed.   _____________________________________________________________________    Malnutrition Status:  Nutrition consulted. Most recent weight and BMI monitored-     Measurements:  Wt Readings from Last 1 Encounters:   06/10/25 80.8 kg (178 lb 2.1 oz)   Body mass index is 24.84 kg/m².    Patient has been screened and assessed by RD.    Malnutrition Type:  Context:    Level:      Malnutrition Characteristic Summary:       Interventions/Recommendations (treatment strategy):        Scheduled Med:   metroNIDAZOLE IV (PEDS and ADULTS)  500 mg Intravenous Q8H    piperacillin-tazobactam (Zosyn) IV (PEDS and ADULTS) (extended infusion is not appropriate)  4.5 g Intravenous Q8H      Continuous Infusions:   lactated ringers   Intravenous Continuous 125 mL/hr at 06/10/25 2156 New Bag at 06/11/25 0608      PRN  Meds:    Current Facility-Administered Medications:     melatonin, 6 mg, Oral, Nightly PRN    morphine, 4 mg, Intravenous, Q4H PRN    ondansetron, 4 mg, Intravenous, Q6H PRN    sodium chloride 0.9%, 10 mL, Intravenous, PRN     Radiology:  I have personally reviewed the following imaging and agree with the radiologist.     CT Abdomen Pelvis With IV Contrast NO Oral Contrast  Narrative: EXAMINATION:  CT ABDOMEN PELVIS WITH IV CONTRAST    CLINICAL HISTORY:  Abdominal pain, acute, nonlocalized;, .    TECHNIQUE:  PATIENT RADIATION DOSE: DLP(mGycm) : 290    As per PQRS measures, all CT scans at this facility used dose modulation, iterative reconstruction, and/or weight based dose adjustment when appropriate to reduce radiation dose to as low as reasonably achievable.    COMPARISON:  None available    FINDINGS:  Serial axial images were obtained through the abdomen and pelvis with the administration of  IV contrast. Coronal and sagittal reconstructions where also obtained. Bony structures appear grossly intact.  The heart is normal in size.  The lung bases are relatively clear.  The liver, gallbladder, spleen, adrenal glands, and pancreas are grossly within normal limits.  The kidneys are relatively symmetric in size.  No hydronephrosis is seen.  Few small lymph nodes are seen within the periaortic and pararcaval region.  No dilated loops of bowel are identified.  Gas and feces are seen within the colon.  The appendix is the enlarged with diffuse surrounding edema and mucosal thickening.  The bladder is partially distended with fluid.  There is mild bladder mucosal thickening.  There is trace free fluid posterior lower pelvis.  Impression: 1. Enlarged.  The appendix with the mucosal thickening and surrounding edema/stranding compatible with acute appendicitis  2. Trace free fluid posterior lower pelvis  3. Findings and other details as above    Electronically signed by: Jay  Brenden  Date:    06/11/2025  Time:    08:53      Montserrat Vasquez MD  Department of Hospital Medicine   [facility]   06/11/2025

## 2025-06-11 NOTE — H&P
[facility] - Medical Surgical Unit    History & Physical      Patient Name: Craig Rivas  MRN: [id number]  Admission Date: 6/10/2025  Attending Physician: Aristeo Whyte MD   Primary Care Provider: Alicia Primary Doctor      Tele-Nocturnist History and Physical  Telemedicine Service was provided using HIPPA compliant web platform using SOC for audio/visual equipment.   Patient Location: [place], [place]  Provider Location: [place], [place]  Participants on call: bedside RN, patient       Patient information was obtained from patient and ER records.     Subjective:     Principal Problem:<principal problem not specified>    Chief Complaint:   Chief Complaint   Patient presents with    Abdominal Pain     Abd pain   started Fri   has moved to RLQ         HPI: 19 y/o with no significant pmhx who presented to the ED for evaluation of abdominal pain that started about 3 days ago and mainly located in the RLQ. No nausea, vomiting, fever or chills.   CT Abd/pelvis consistent with Acute Appendicitis and General surgery consulted who plans to do Lap indigo in am.    Lab data: WBC 9.63, Hg 13.4     ED course:  I personally spoke with ED clinician regarding the case and reviewed his notes. Pertinent ED findings noted.    Past Medical History:   Diagnosis Date    Known health problems: none        Past Surgical History:   Procedure Laterality Date    none         Review of patient's allergies indicates:  No Known Allergies    No current facility-administered medications on file prior to encounter.     Current Outpatient Medications on File Prior to Encounter   Medication Sig    mupirocin (BACTROBAN) 2 % ointment Apply topically 3 (three) times daily.     Family History       Problem Relation (Age of Onset)    No Known Problems Mother, Father          Tobacco Use    Smoking status: Never    Smokeless tobacco: Never   Vaping Use    Vaping status: Never Used   Substance and Sexual Activity    Alcohol use: Not  Currently    Drug use: Yes     Types: Marijuana    Sexual activity: Not on file     Review of Systems  All systems reviewed and stated as negative per the patient except as stated in HPI.  GEN: denies fever or chills, denies fatigue  Eye: denies visual changes  HENT: denies throat pain  RESP: denies shortness of breath, cough or sputum production  CARDIAC: denies chest pain or palpitations  GI: denies nausea, vomiting, or diarrhea  SKIN: denies rash    Objective:     Vital Signs (Most Recent):  Temp: 98.7 °F (37.1 °C) (06/10/25 2131)  Pulse: 82 (06/10/25 2131)  Resp: 18 (06/10/25 2131)  BP: 126/71 (06/10/25 2131)  SpO2: 100 % (06/10/25 2131) Vital Signs (24h Range):  Temp:  [98.2 °F (36.8 °C)-98.7 °F (37.1 °C)] 98.7 °F (37.1 °C)  Pulse:  [72-82] 82  Resp:  [16-18] 18  SpO2:  [98 %-100 %] 100 %  BP: (121-131)/(66-80) 126/71     Weight: 86.2 kg (190 lb)  Body mass index is 26.5 kg/m².    Physical Exam   GEN: Appears stated age, no acute distress, well nourished  EYES: no conjunctival injection, EOMI, normal hearing  HENT: no goiter, moist mucous membranes, no glossitis  RESP: clear to auscultation bilaterally, no conversational dyspnea, no accessory muscle use  CARDIAC: regular rate and rhythm, no murmurs, rubs or gallops, no edema  GI: RLQ tenderness+  SKIN: no rashes noted  Neuro: CN II-XII intact, non focal exam  Psych: Alert and oriented x3, normal affect    Significant Labs: All pertinent labs within the past 24 hours have been reviewed.    Significant Imaging: I have reviewed all pertinent imaging results/findings within the past 24 hours.    Assessment/Plan:     #. Acute Appendicitis  -NPO  -IV Fluid  -Morphine iv prn  -Zosyn and Metronidazole IV  -General Surgery consulted in ED    VTE: Early ambulation    Independently reviewed imaging, labs and vitals as pertinent to clinical condition  Discharge Disposition:     Time spent with patient, assessment, chart review, medication, patient education: > 75 minutes          Disposition:   - The patient is expected to have a LOS less than 2 midnights and will be admitted to observation status under my care    VTE Risk Mitigation (From admission, onward)           Ordered     IP VTE LOW RISK PATIENT  Once         06/10/25 2019     Place sequential compression device  Until discontinued         06/10/25 2019                      Aristeo Whyte MD  Department of Hospital Medicine   [facility] - Medical Surgical Unit

## 2025-06-11 NOTE — H&P (VIEW-ONLY)
[facility]  Medical Surgical Unit  General Surgery  Consult Note    Consults  Subjective:     Chief Complaint/Reason for Admission:  Right lower quadrant pain    History of Present Illness:  20-year-old white male with complaint epigastric pain which isolated to itself in the right lower quadrant over the last 3 days.  Patient stayed on Friday evening began to have some discomfort which began to radiate down to the right lower quadrant.  It isolated itself in that position for the last 24 hours.  It has been decreasing in quantity and quality.  He denies associated fevers sweats and chills.  He underwent a CT scan of the abdomen which identified inflammatory changes consistent with acute appendicitis.  He is being admitted for laparoscopic appendectomy and empiric IV antibiotic therapy.    No current facility-administered medications on file prior to encounter.     Current Outpatient Medications on File Prior to Encounter   Medication Sig    mupirocin (BACTROBAN) 2 % ointment Apply topically 3 (three) times daily.       Review of patient's allergies indicates:  No Known Allergies    Past Medical History:   Diagnosis Date    Known health problems: none      Past Surgical History:   Procedure Laterality Date    none       Family History       Problem Relation (Age of Onset)    No Known Problems Mother, Father          Tobacco Use    Smoking status: Never    Smokeless tobacco: Never   Vaping Use    Vaping status: Never Used   Substance and Sexual Activity    Alcohol use: Not Currently    Drug use: Yes     Types: Marijuana    Sexual activity: Not on file     Review of Systems   All other systems reviewed and are negative.    Objective:     Vital Signs (Most Recent):  Temp: 98.7 °F (37.1 °C) (06/10/25 2131)  Pulse: 82 (06/10/25 2131)  Resp: 18 (06/10/25 2131)  BP: 126/71 (06/10/25 2131)  SpO2: 100 % (06/10/25 2131) Vital Signs (24h Range):  Temp:  [98.2 °F (36.8 °C)-98.7 °F (37.1 °C)] 98.7 °F (37.1  °C)  Pulse:  [72-82] 82  Resp:  [16-18] 18  SpO2:  [98 %-100 %] 100 %  BP: (121-131)/(66-80) 126/71     Weight: 86.2 kg (190 lb)  Body mass index is 26.5 kg/m².      Intake/Output Summary (Last 24 hours) at 6/10/2025 2157  Last data filed at 6/10/2025 2114  Gross per 24 hour   Intake 100.17 ml   Output --   Net 100.17 ml       Physical Exam  Vitals reviewed.   Constitutional:       Appearance: Normal appearance.   HENT:      Head: Normocephalic and atraumatic.      Nose: Nose normal.   Eyes:      Extraocular Movements: Extraocular movements intact.      Pupils: Pupils are equal, round, and reactive to light.   Cardiovascular:      Rate and Rhythm: Normal rate and regular rhythm.   Pulmonary:      Effort: Pulmonary effort is normal.      Breath sounds: Normal breath sounds.   Abdominal:      General: Abdomen is flat. Bowel sounds are normal. There is no distension.      Palpations: Abdomen is soft. There is no mass.      Tenderness: There is abdominal tenderness. There is guarding and rebound. There is no right CVA tenderness or left CVA tenderness.      Hernia: No hernia is present.      Comments: Isolated peritoneal irritation located in the right lower quadrant with rebound consistent with acute appendicitis and McBurney sign   Genitourinary:     Penis: Normal.    Musculoskeletal:         General: Normal range of motion.      Cervical back: Neck supple.   Skin:     General: Skin is warm and dry.   Neurological:      General: No focal deficit present.      Mental Status: He is alert.         Significant Labs:  CBC:   Recent Labs   Lab 06/10/25  1844   WBC 9.63   RBC 4.65*   HGB 13.4*   HCT 39.7*      MCV 85.4   MCH 28.8   MCHC 33.8     CMP:   Recent Labs   Lab 06/10/25  1844   GLU 86   CALCIUM 9.5   ALBUMIN 4.4   PROT 8.5*      K 3.6   CO2 27      BUN 13.0   CREATININE 0.90   ALKPHOS 65   ALT 36   AST 21   BILITOT 1.3       Significant Diagnostics:  CT: I have reviewed all pertinent  results/findings within the past 24 hours.  Acute appendicitis    Assessment/Plan:     Patient to remain NPO and empiric IV antibiotic therapy with plan for appendectomy in the a.m.  Consent for laparoscopic appendectomy has been obtained after risks benefits and alternatives to procedure explained and all questions answered      Thank you for your consult. I will follow-up with patient. Please contact us if you have any additional questions.    Jacki Slater MD  General Surgery  [facility] - Medical Surgical Unit

## 2025-06-11 NOTE — PLAN OF CARE
Problem: Adult Inpatient Plan of Care  Goal: Plan of Care Review  Outcome: Progressing     Problem: Adult Inpatient Plan of Care  Goal: Patient-Specific Goal (Individualized)  Outcome: Progressing     Problem: Pain Acute  Goal: Optimal Pain Control and Function  Outcome: Progressing

## 2025-06-11 NOTE — ANESTHESIA PROCEDURE NOTES
Intubation    Date/Time: 6/11/2025 5:54 AM    Performed by: Braulio Resendiz CRNA  Authorized by: Robert Hess DO    Intubation:     Induction:  Intravenous    Intubated:  Postinduction    Mask Ventilation:  Easy mask    Attempts:  1    Attempted By:  CRNA    Method of Intubation:  Direct    Blade:  Wiley 2    Laryngeal View Grade: Grade I - full view of cords      Difficult Airway Encountered?: No      Complications:  None    Airway Device:  Oral endotracheal tube    Airway Device Size:  7.5    Style/Cuff Inflation:  Cuffed (inflated to minimal occlusive pressure)    Inflation Amount (mL):  6    Tube secured:  22    Secured at:  The lips    Placement Verified By:  Capnometry and Colorimetric ETCO2 device    Complicating Factors:  None    Findings Post-Intubation:  BS equal bilateral and atraumatic/condition of teeth unchanged

## 2025-06-11 NOTE — PLAN OF CARE
Problem: Adult Inpatient Plan of Care  Goal: Plan of Care Review  Outcome: Progressing  Goal: Patient-Specific Goal (Individualized)  Outcome: Progressing  Goal: Absence of Hospital-Acquired Illness or Injury  Outcome: Progressing  Goal: Optimal Comfort and Wellbeing  Outcome: Progressing  Goal: Readiness for Transition of Care  Outcome: Progressing     Problem: Pain Acute  Goal: Optimal Pain Control and Function  Outcome: Progressing     Problem: Appendectomy  Goal: Absence of Bleeding  Outcome: Progressing  Goal: Effective Bowel Elimination  Outcome: Progressing  Goal: Fluid and Electrolyte Balance  Outcome: Progressing  Goal: Absence of Infection Signs and Symptoms  Outcome: Progressing  Goal: Anesthesia/Sedation Recovery  Outcome: Progressing  Goal: Acceptable Pain Control  Outcome: Progressing  Goal: Nausea and Vomiting Relief  Outcome: Progressing  Goal: Effective Urinary Elimination  Outcome: Progressing  Goal: Effective Oxygenation and Ventilation  Outcome: Progressing

## 2025-06-12 VITALS
OXYGEN SATURATION: 98 % | HEART RATE: 63 BPM | BODY MASS INDEX: 24.94 KG/M2 | HEIGHT: 71 IN | SYSTOLIC BLOOD PRESSURE: 117 MMHG | DIASTOLIC BLOOD PRESSURE: 65 MMHG | WEIGHT: 178.13 LBS | TEMPERATURE: 99 F | RESPIRATION RATE: 20 BRPM

## 2025-06-12 LAB
BASOPHILS # BLD AUTO: 0.04 X10(3)/MCL
BASOPHILS NFR BLD AUTO: 0.4 %
EOSINOPHIL # BLD AUTO: 0.06 X10(3)/MCL (ref 0–0.9)
EOSINOPHIL NFR BLD AUTO: 0.6 %
ERYTHROCYTE [DISTWIDTH] IN BLOOD BY AUTOMATED COUNT: 12.1 % (ref 11.5–17)
HCT VFR BLD AUTO: 35.6 % (ref 42–52)
HGB BLD-MCNC: 12.1 G/DL (ref 14–18)
IMM GRANULOCYTES # BLD AUTO: 0.03 X10(3)/MCL (ref 0–0.04)
IMM GRANULOCYTES NFR BLD AUTO: 0.3 %
LYMPHOCYTES # BLD AUTO: 1.47 X10(3)/MCL (ref 0.6–4.6)
LYMPHOCYTES NFR BLD AUTO: 15.1 %
MCH RBC QN AUTO: 28.9 PG (ref 27–31)
MCHC RBC AUTO-ENTMCNC: 34 G/DL (ref 33–36)
MCV RBC AUTO: 85 FL (ref 80–94)
MONOCYTES # BLD AUTO: 0.72 X10(3)/MCL (ref 0.1–1.3)
MONOCYTES NFR BLD AUTO: 7.4 %
NEUTROPHILS # BLD AUTO: 7.44 X10(3)/MCL (ref 2.1–9.2)
NEUTROPHILS NFR BLD AUTO: 76.2 %
NRBC BLD AUTO-RTO: 0 %
PLATELET # BLD AUTO: 256 X10(3)/MCL (ref 130–400)
PMV BLD AUTO: 10.7 FL (ref 7.4–10.4)
RBC # BLD AUTO: 4.19 X10(6)/MCL (ref 4.7–6.1)
WBC # BLD AUTO: 9.76 X10(3)/MCL (ref 4.5–11.5)

## 2025-06-12 PROCEDURE — 85025 COMPLETE CBC W/AUTO DIFF WBC: CPT | Performed by: SURGERY

## 2025-06-12 PROCEDURE — G0378 HOSPITAL OBSERVATION PER HR: HCPCS

## 2025-06-12 PROCEDURE — 25000003 PHARM REV CODE 250: Performed by: SURGERY

## 2025-06-12 PROCEDURE — 36415 COLL VENOUS BLD VENIPUNCTURE: CPT | Performed by: SURGERY

## 2025-06-12 PROCEDURE — 63600175 PHARM REV CODE 636 W HCPCS: Performed by: SURGERY

## 2025-06-12 PROCEDURE — 94761 N-INVAS EAR/PLS OXIMETRY MLT: CPT

## 2025-06-12 RX ORDER — OXYCODONE AND ACETAMINOPHEN 5; 325 MG/1; MG/1
1 TABLET ORAL EVERY 6 HOURS PRN
Qty: 20 TABLET | Refills: 0 | Status: SHIPPED | OUTPATIENT
Start: 2025-06-12

## 2025-06-12 RX ORDER — SODIUM CHLORIDE, SODIUM LACTATE, POTASSIUM CHLORIDE, CALCIUM CHLORIDE 600; 310; 30; 20 MG/100ML; MG/100ML; MG/100ML; MG/100ML
INJECTION, SOLUTION INTRAVENOUS CONTINUOUS
Status: DISCONTINUED | OUTPATIENT
Start: 2025-06-12 | End: 2025-06-12

## 2025-06-12 RX ORDER — AMOXICILLIN AND CLAVULANATE POTASSIUM 875; 125 MG/1; MG/1
1 TABLET, FILM COATED ORAL EVERY 12 HOURS
Qty: 14 TABLET | Refills: 0 | Status: SHIPPED | OUTPATIENT
Start: 2025-06-12

## 2025-06-12 RX ORDER — METRONIDAZOLE 500 MG/1
500 TABLET ORAL EVERY 8 HOURS
Qty: 21 TABLET | Refills: 0 | Status: SHIPPED | OUTPATIENT
Start: 2025-06-12 | End: 2025-06-19

## 2025-06-12 RX ADMIN — SODIUM CHLORIDE, POTASSIUM CHLORIDE, SODIUM LACTATE AND CALCIUM CHLORIDE: 600; 310; 30; 20 INJECTION, SOLUTION INTRAVENOUS at 06:06

## 2025-06-12 RX ADMIN — METRONIDAZOLE 500 MG: 5 INJECTION, SOLUTION INTRAVENOUS at 02:06

## 2025-06-12 RX ADMIN — PIPERACILLIN AND TAZOBACTAM 4.5 G: 4; .5 INJECTION, POWDER, LYOPHILIZED, FOR SOLUTION INTRAVENOUS at 06:06

## 2025-06-12 RX ADMIN — METRONIDAZOLE 500 MG: 5 INJECTION, SOLUTION INTRAVENOUS at 10:06

## 2025-06-12 RX ADMIN — HYDROCODONE BITARTRATE AND ACETAMINOPHEN 1 TABLET: 7.5; 325 TABLET ORAL at 02:06

## 2025-06-12 NOTE — HOSPITAL COURSE
20-year-old white male admitted through emergency department complaint of right lower quadrant pain and discomfort.  He had clinical and imaging evidence consistent with acute appendicitis the patient underwent endoscopic appendectomy for treatment.  It was uncomplicated.  He had appropriate recovery.  He remained afebrile with normal laid in a leukocytosis tendons able tolerate a diet with the appropriate pain control.  It was found stable for discharge to home.  He will be discharged with Percocet as needed for pain and Augmentin and Flagyl for outpatient antibiotic therapy.  It will give it given a short interval follow up in surgery Clinic for re-evaluation.

## 2025-06-12 NOTE — PLAN OF CARE
06/12/25 1143   Final Note   Assessment Type Final Discharge Note   Anticipated Discharge Disposition Home   Post-Acute Status   Discharge Delays None known at this time     D/c home today.  No d/c needs.

## 2025-06-12 NOTE — DISCHARGE SUMMARY
[facility] - Medical Surgical Unit  General Surgery  Discharge Summary      Patient Name: Craig Rivas  MRN: [id number]  Admission Date: 6/10/2025  Hospital Length of Stay: 0 days  Discharge Date and Time: 06/12/2025 10:11 AM  Attending Physician: Montserrat Vasquez MD   Discharging Provider: Jacki Christensen MD  Primary Care Provider: Alicia, Primary Doctor    HPI:   No notes on file    Procedure(s) (LRB):  APPENDECTOMY, LAPAROSCOPIC (N/A)      Indwelling Lines/Drains at time of discharge:   Lines/Drains/Airways       None                 Hospital Course: 20-year-old white male admitted through emergency department complaint of right lower quadrant pain and discomfort.  He had clinical and imaging evidence consistent with acute appendicitis the patient underwent endoscopic appendectomy for treatment.  It was uncomplicated.  He had appropriate recovery.  He remained afebrile with normal laid in a leukocytosis tendons able tolerate a diet with the appropriate pain control.  It was found stable for discharge to home.  He will be discharged with Percocet as needed for pain and Augmentin and Flagyl for outpatient antibiotic therapy.  It will give it given a short interval follow up in surgery Clinic for re-evaluation.    Goals of Care Treatment Preferences:  Code Status: Full Code      Consults:   Consults (From admission, onward)          Status Ordering Provider     Inpatient consult to General surgery  Once        Provider:  Jacki Christensen MD    Acknowledged JACKI CHRISTENSEN            Significant Diagnostic Studies: Labs: CBC   Recent Labs   Lab 06/10/25  1844 06/11/25  0300 06/12/25  0916   WBC 9.63 7.62 9.76   HGB 13.4* 11.4* 12.1*   HCT 39.7* 34.7* 35.6*    230 256       Pending Diagnostic Studies:       Procedure Component Value Units Date/Time    Specimen to Pathology General Surgery [[id number]] Collected: 06/11/25 0538    Order Status: Sent Lab Status: In process Updated: 06/11/25 1305    Specimen:  Tissue from Appendix           Final Active Diagnoses:    Diagnosis Date Noted POA    PRINCIPAL PROBLEM:  Acute appendicitis with generalized peritonitis without gangrene, perforation, or abscess [K35.200] 06/11/2025 Yes      Problems Resolved During this Admission:      Discharged Condition: stable    Disposition: Home or Self Care    Follow Up:    Patient Instructions:   No discharge procedures on file.  Medications:  Reconciled Home Medications:      Medication List        START taking these medications      amoxicillin-clavulanate 875-125mg 875-125 mg per tablet  Commonly known as: AUGMENTIN  Take 1 tablet by mouth every 12 (twelve) hours.     metroNIDAZOLE 500 MG tablet  Commonly known as: FLAGYL  Take 1 tablet (500 mg total) by mouth every 8 (eight) hours. for 7 days     oxyCODONE-acetaminophen 5-325 mg per tablet  Commonly known as: PERCOCET  Take 1 tablet by mouth every 6 (six) hours as needed for Pain.            STOP taking these medications      mupirocin 2 % ointment  Commonly known as: BACTROBAN            Time spent on the discharge of patient:  10 minutes    Jacki Slater MD  General Surgery  [facility] - Medical Surgical Unit

## 2025-06-12 NOTE — DISCHARGE SUMMARY
Hospital Medicine discharge summary      Chief Complaint: Inpatient Follow-up for     HPI:   21 y/o with no significant pmhx who presented to the ED for evaluation of abdominal pain that started about 3 days ago and mainly located in the RLQ. No nausea, vomiting, fever or chills.   CT Abd/pelvis consistent with Acute Appendicitis and General surgery consulted     June 11, 2025-general surgeon performed laparoscopic appendectomy this morning, complain of abdominal pain after surgery, no fever, no shortness for breath, no nausea    June 12, 2025-patient slept well last night, no nausea, no vomiting, no abdominal pain    If general surgeon agrees, patient can go home today and have a follow-up with the general surgeon    Case was discussed with patient's nurse and  on the floor.    Objective/physical exam:  General: In no acute distress, afebrile  Chest: Clear to auscultation bilaterally  Heart: RRR, +S1, S2, no appreciable murmur  Abdomen: Soft, tender, BS +, laparoscopic surgical site covered with a dressing  MSK: Warm, no lower extremity edema, no clubbing or cyanosis  Neurologic: Alert and oriented x4, Cranial nerve II-XII intact, Strength 5/5 in all 4 extremities    VITAL SIGNS: 24 HRS MIN & MAX LAST   Temp  Min: 98.1 °F (36.7 °C)  Max: 98.6 °F (37 °C) 98.3 °F (36.8 °C)   BP  Min: 111/60  Max: 143/86 112/61   Pulse  Min: 62  Max: 90  64   Resp  Min: 16  Max: 20 20   SpO2  Min: 96 %  Max: 100 % 99 %     I have reviewed the following labs:  Recent Labs   Lab 06/10/25  1844 06/11/25  0300 06/12/25  0916   WBC 9.63 7.62 9.76   RBC 4.65* 4.02* 4.19*   HGB 13.4* 11.4* 12.1*   HCT 39.7* 34.7* 35.6*   MCV 85.4 86.3 85.0   MCH 28.8 28.4 28.9   MCHC 33.8 32.9* 34.0   RDW 12.3 12.1 12.1    230 256   MPV 10.7* 10.8* 10.7*     Recent Labs   Lab 06/10/25  1844 06/11/25  0301    140   K 3.6 3.8    104   CO2 27 28   BUN 13.0 12.0   CREATININE 0.90 0.95   GLU 86 83   CALCIUM 9.5 9.2   ALBUMIN 4.4  3.8   PROT 8.5* 7.2   ALKPHOS 65 56   ALT 36 31   AST 21 17   BILITOT 1.3 1.4     Microbiology Results (last 7 days)       ** No results found for the last 168 hours. **             See below for Radiology    Discharge diagnosis  Acute appendicitis   Anemia   Status post laparoscopic appendectomy June 11, 2025     Discharge condition-stable     Discharge destination-home-discharge time 30 minutes          VTE prophylaxis:     Patient condition:  Stable/Fair/Guarded/ Serious/ Critical    Anticipated discharge and Disposition:         All diagnosis and differential diagnosis have been reviewed; assessment and plan has been documented; I have personally reviewed the labs and test results that are presently available; I have reviewed the patients medication list; I have reviewed the consulting providers response and recommendations. I have reviewed or attempted to review medical records based upon their availability    All of the patient's questions have been  addressed and answered. Patient's is agreeable to the above stated plan. I will continue to monitor closely and make adjustments to medical management as needed.    Portions of this note dictated using EMR integrated voice recognition software, and may be subject to voice recognition errors not corrected at proofreading. Please contact writer for clarification if needed.   _____________________________________________________________________    Malnutrition Status:  Nutrition consulted. Most recent weight and BMI monitored-     Measurements:  Wt Readings from Last 1 Encounters:   06/10/25 80.8 kg (178 lb 2.1 oz)   Body mass index is 24.84 kg/m².    Patient has been screened and assessed by RD.    Malnutrition Type:  Context:    Level:      Malnutrition Characteristic Summary:       Interventions/Recommendations (treatment strategy):        Scheduled Med:   metroNIDAZOLE IV (PEDS and ADULTS)  500 mg Intravenous Q8H    piperacillin-tazobactam (Zosyn) IV (PEDS and  ADULTS) (extended infusion is not appropriate)  4.5 g Intravenous Q8H      Continuous Infusions:   lactated ringers   Intravenous Continuous 100 mL/hr at 06/12/25 0652 Rate Verify at 06/12/25 0652      PRN Meds:    Current Facility-Administered Medications:     HYDROcodone-acetaminophen, 1 tablet, Oral, Q6H PRN    melatonin, 6 mg, Oral, Nightly PRN    morphine, 4 mg, Intravenous, Q4H PRN    ondansetron, 4 mg, Intravenous, Q4H PRN    sodium chloride 0.9%, 10 mL, Intravenous, PRN     Radiology:  I have personally reviewed the following imaging and agree with the radiologist.     CT Abdomen Pelvis With IV Contrast NO Oral Contrast  Narrative: EXAMINATION:  CT ABDOMEN PELVIS WITH IV CONTRAST    CLINICAL HISTORY:  Abdominal pain, acute, nonlocalized;, .    TECHNIQUE:  PATIENT RADIATION DOSE: DLP(mGycm) : 290    As per PQRS measures, all CT scans at this facility used dose modulation, iterative reconstruction, and/or weight based dose adjustment when appropriate to reduce radiation dose to as low as reasonably achievable.    COMPARISON:  None available    FINDINGS:  Serial axial images were obtained through the abdomen and pelvis with the administration of  IV contrast. Coronal and sagittal reconstructions where also obtained. Bony structures appear grossly intact.  The heart is normal in size.  The lung bases are relatively clear.  The liver, gallbladder, spleen, adrenal glands, and pancreas are grossly within normal limits.  The kidneys are relatively symmetric in size.  No hydronephrosis is seen.  Few small lymph nodes are seen within the periaortic and pararcaval region.  No dilated loops of bowel are identified.  Gas and feces are seen within the colon.  The appendix is the enlarged with diffuse surrounding edema and mucosal thickening.  The bladder is partially distended with fluid.  There is mild bladder mucosal thickening.  There is trace free fluid posterior lower pelvis.  Impression: 1. Enlarged.  The appendix  with the mucosal thickening and surrounding edema/stranding compatible with acute appendicitis  2. Trace free fluid posterior lower pelvis  3. Findings and other details as above    Electronically signed by: Jay Wilcox  Date:    06/11/2025  Time:    08:53      Montserrat Vasquez MD  Department of Hospital Medicine   [facility]   06/12/2025

## 2025-06-12 NOTE — PLAN OF CARE
Problem: Adult Inpatient Plan of Care  Goal: Plan of Care Review  Outcome: Progressing  Goal: Patient-Specific Goal (Individualized)  Outcome: Progressing  Goal: Absence of Hospital-Acquired Illness or Injury  Outcome: Progressing  Goal: Optimal Comfort and Wellbeing  Outcome: Progressing  Goal: Readiness for Transition of Care  Outcome: Progressing     Problem: Pain Acute  Goal: Optimal Pain Control and Function  Outcome: Progressing     Problem: Appendectomy  Goal: Absence of Bleeding  Outcome: Progressing  Goal: Effective Bowel Elimination  Outcome: Progressing  Goal: Fluid and Electrolyte Balance  Outcome: Progressing  Goal: Absence of Infection Signs and Symptoms  Outcome: Progressing  Goal: Anesthesia/Sedation Recovery  Outcome: Progressing  Goal: Acceptable Pain Control  Outcome: Progressing  Goal: Nausea and Vomiting Relief  Outcome: Progressing  Goal: Effective Urinary Elimination  Outcome: Progressing  Goal: Effective Oxygenation and Ventilation  Outcome: Progressing     Problem: Wound  Goal: Optimal Coping  Outcome: Progressing  Goal: Optimal Functional Ability  Outcome: Progressing  Goal: Absence of Infection Signs and Symptoms  Outcome: Progressing  Goal: Improved Oral Intake  Outcome: Progressing  Goal: Optimal Pain Control and Function  Outcome: Progressing  Goal: Skin Health and Integrity  Outcome: Progressing  Goal: Optimal Wound Healing  Outcome: Progressing

## 2025-06-18 PROBLEM — Z48.89 POSTOPERATIVE VISIT: Status: ACTIVE | Noted: 2025-06-18

## 2025-06-18 LAB — PSYCHE PATHOLOGY RESULT: NORMAL

## (undated) DEVICE — TROCAR KII FIOS ZTHREAD 12X100

## (undated) DEVICE — SUT VICRYL+ 27 UR-6 VIOL

## (undated) DEVICE — NDL PNEUMO INSUFFLATI 120MM

## (undated) DEVICE — HOLDER SCALPEL SURGICAL GOLD

## (undated) DEVICE — SUT MONOCRYL 4-0 PS-2

## (undated) DEVICE — GLOVE SIGNATURE ESSNTL LTX 6.5

## (undated) DEVICE — BAG TISSUE RETRIEVAL 5MM

## (undated) DEVICE — GLOVE SENSICARE PI GRN 6.5

## (undated) DEVICE — STAPLER ECHELON STAND 45X340MM

## (undated) DEVICE — SOL 9P NACL IRR PIC IL

## (undated) DEVICE — BLANKET SNUGGLE WARM ADLT SM

## (undated) DEVICE — SYR DISP LL 5CC

## (undated) DEVICE — NDL GRANEE OPEN LOOP GRASPER

## (undated) DEVICE — SHEAR HARMONIC ACE+ 5MM 36CM

## (undated) DEVICE — RELOAD ECHELON ENDOPATH 45MM

## (undated) DEVICE — NDL HYPO POLYPR STD 26G 1.5IN

## (undated) DEVICE — BLADE SURG CARBON STEEL SZ11

## (undated) DEVICE — GLOVE SIGNATURE ESSNTL LTX 8

## (undated) DEVICE — GLOVE SENSICARE PI GRN 7

## (undated) DEVICE — GOWN POLY REINF SET SLV XL

## (undated) DEVICE — IRRIGATOR HYDRO-SURG PLUS 5MM

## (undated) DEVICE — GLOVE SENSICARE NEOPRENE 6.5

## (undated) DEVICE — SPONGE LAP 18X18 PREWASHED

## (undated) DEVICE — TROCAR ENDO Z THREAD KII 5X100

## (undated) DEVICE — SLEEVE KII ADV FIX 5X100MM

## (undated) DEVICE — TRAY CATH 1-LYR URIMTR 16FR

## (undated) DEVICE — MEDIPORE+PAD

## (undated) DEVICE — Device

## (undated) DEVICE — CARTRIDGE BABCOCK GRASPER 5X38

## (undated) DEVICE — DRAPE DEVON INSTRUMENT 10X16IN

## (undated) DEVICE — GLOVE SIGNATURE ESSNTL LTX 7